# Patient Record
Sex: MALE | Race: WHITE | NOT HISPANIC OR LATINO | Employment: OTHER | URBAN - NONMETROPOLITAN AREA
[De-identification: names, ages, dates, MRNs, and addresses within clinical notes are randomized per-mention and may not be internally consistent; named-entity substitution may affect disease eponyms.]

---

## 2023-11-17 PROBLEM — E03.9 HYPOTHYROID: Status: ACTIVE | Noted: 2023-11-17

## 2023-11-17 PROBLEM — R10.30 INGUINAL PAIN: Status: RESOLVED | Noted: 2023-11-17 | Resolved: 2023-11-17

## 2023-11-17 PROBLEM — R22.0 FACIAL SWELLING: Status: RESOLVED | Noted: 2023-11-17 | Resolved: 2023-11-17

## 2023-11-17 PROBLEM — T14.8XXA LOCAL INFECTION OF WOUND: Status: RESOLVED | Noted: 2023-11-17 | Resolved: 2023-11-17

## 2023-11-17 PROBLEM — L08.9 LOCAL INFECTION OF WOUND: Status: RESOLVED | Noted: 2023-11-17 | Resolved: 2023-11-17

## 2023-11-17 PROBLEM — M25.559 HIP PAIN: Status: ACTIVE | Noted: 2023-11-17

## 2023-11-17 PROBLEM — R06.00 DYSPNEA: Status: ACTIVE | Noted: 2023-11-17

## 2023-11-17 PROBLEM — L02.01 ABSCESS OF FACE: Status: RESOLVED | Noted: 2023-11-17 | Resolved: 2023-11-17

## 2023-11-17 PROBLEM — R00.2 PALPITATIONS: Status: ACTIVE | Noted: 2023-11-17

## 2023-11-17 PROBLEM — K29.60 REFLUX GASTRITIS: Status: RESOLVED | Noted: 2023-11-17 | Resolved: 2023-11-17

## 2023-11-17 PROBLEM — R07.9 CHEST PAIN: Status: ACTIVE | Noted: 2023-11-17

## 2023-11-17 RX ORDER — LEVOTHYROXINE SODIUM 75 UG/1
75 TABLET ORAL
COMMUNITY
Start: 2023-11-11 | End: 2024-11-10

## 2023-11-17 RX ORDER — METOPROLOL TARTRATE 50 MG/1
50 TABLET ORAL 2 TIMES DAILY
COMMUNITY
Start: 2022-02-07

## 2023-11-17 RX ORDER — FLECAINIDE ACETATE 50 MG/1
50 TABLET ORAL 2 TIMES DAILY
COMMUNITY

## 2023-11-17 RX ORDER — OMEPRAZOLE AND SODIUM BICARBONATE 40; 1100 MG/1; MG/1
1 CAPSULE ORAL
COMMUNITY
Start: 2022-02-07

## 2024-12-29 ENCOUNTER — APPOINTMENT (OUTPATIENT)
Dept: CARDIOLOGY | Facility: HOSPITAL | Age: 64
End: 2024-12-29
Payer: COMMERCIAL

## 2024-12-29 ENCOUNTER — APPOINTMENT (OUTPATIENT)
Dept: RADIOLOGY | Facility: HOSPITAL | Age: 64
End: 2024-12-29
Payer: COMMERCIAL

## 2024-12-29 ENCOUNTER — HOSPITAL ENCOUNTER (EMERGENCY)
Facility: HOSPITAL | Age: 64
Discharge: HOME | End: 2024-12-29
Attending: EMERGENCY MEDICINE
Payer: COMMERCIAL

## 2024-12-29 ENCOUNTER — OFFICE VISIT (OUTPATIENT)
Dept: URGENT CARE | Age: 64
End: 2024-12-29
Payer: COMMERCIAL

## 2024-12-29 VITALS
BODY MASS INDEX: 32.96 KG/M2 | HEIGHT: 67 IN | DIASTOLIC BLOOD PRESSURE: 87 MMHG | SYSTOLIC BLOOD PRESSURE: 116 MMHG | OXYGEN SATURATION: 97 % | RESPIRATION RATE: 16 BRPM | WEIGHT: 210 LBS | HEART RATE: 74 BPM | TEMPERATURE: 98.2 F

## 2024-12-29 VITALS
TEMPERATURE: 98.1 F | SYSTOLIC BLOOD PRESSURE: 115 MMHG | HEIGHT: 67 IN | RESPIRATION RATE: 16 BRPM | HEART RATE: 67 BPM | DIASTOLIC BLOOD PRESSURE: 85 MMHG | OXYGEN SATURATION: 95 % | WEIGHT: 210 LBS | BODY MASS INDEX: 32.96 KG/M2

## 2024-12-29 DIAGNOSIS — R09.1 PLEURITIS: ICD-10-CM

## 2024-12-29 DIAGNOSIS — R07.9 CHEST PAIN, UNSPECIFIED TYPE: ICD-10-CM

## 2024-12-29 DIAGNOSIS — R07.89 OTHER CHEST PAIN: ICD-10-CM

## 2024-12-29 DIAGNOSIS — B34.2 CORONAVIRUS INFECTION: Primary | ICD-10-CM

## 2024-12-29 LAB
ALBUMIN SERPL BCP-MCNC: 4.2 G/DL (ref 3.4–5)
ALP SERPL-CCNC: 119 U/L (ref 33–136)
ALT SERPL W P-5'-P-CCNC: 60 U/L (ref 10–52)
ANION GAP SERPL CALC-SCNC: 12 MMOL/L (ref 10–20)
AST SERPL W P-5'-P-CCNC: 43 U/L (ref 9–39)
BASOPHILS # BLD AUTO: 0.02 X10*3/UL (ref 0–0.1)
BASOPHILS NFR BLD AUTO: 0.3 %
BILIRUB SERPL-MCNC: 0.6 MG/DL (ref 0–1.2)
BNP SERPL-MCNC: 15 PG/ML (ref 0–99)
BUN SERPL-MCNC: 23 MG/DL (ref 6–23)
CALCIUM SERPL-MCNC: 10.1 MG/DL (ref 8.6–10.3)
CARDIAC TROPONIN I PNL SERPL HS: <3 NG/L (ref 0–20)
CARDIAC TROPONIN I PNL SERPL HS: <3 NG/L (ref 0–20)
CHLORIDE SERPL-SCNC: 100 MMOL/L (ref 98–107)
CO2 SERPL-SCNC: 30 MMOL/L (ref 21–32)
CREAT SERPL-MCNC: 1.18 MG/DL (ref 0.5–1.3)
EGFRCR SERPLBLD CKD-EPI 2021: 69 ML/MIN/1.73M*2
EOSINOPHIL # BLD AUTO: 0.14 X10*3/UL (ref 0–0.7)
EOSINOPHIL NFR BLD AUTO: 2.3 %
ERYTHROCYTE [DISTWIDTH] IN BLOOD BY AUTOMATED COUNT: 13.4 % (ref 11.5–14.5)
GLUCOSE SERPL-MCNC: 100 MG/DL (ref 74–99)
HCT VFR BLD AUTO: 49 % (ref 41–52)
HGB BLD-MCNC: 16.2 G/DL (ref 13.5–17.5)
IMM GRANULOCYTES # BLD AUTO: 0.02 X10*3/UL (ref 0–0.7)
IMM GRANULOCYTES NFR BLD AUTO: 0.3 % (ref 0–0.9)
LYMPHOCYTES # BLD AUTO: 1.7 X10*3/UL (ref 1.2–4.8)
LYMPHOCYTES NFR BLD AUTO: 28.5 %
MAGNESIUM SERPL-MCNC: 1.98 MG/DL (ref 1.6–2.4)
MCH RBC QN AUTO: 28.5 PG (ref 26–34)
MCHC RBC AUTO-ENTMCNC: 33.1 G/DL (ref 32–36)
MCV RBC AUTO: 86 FL (ref 80–100)
MONOCYTES # BLD AUTO: 0.81 X10*3/UL (ref 0.1–1)
MONOCYTES NFR BLD AUTO: 13.6 %
NEUTROPHILS # BLD AUTO: 3.28 X10*3/UL (ref 1.2–7.7)
NEUTROPHILS NFR BLD AUTO: 55 %
NRBC BLD-RTO: 0 /100 WBCS (ref 0–0)
PLATELET # BLD AUTO: 210 X10*3/UL (ref 150–450)
POTASSIUM SERPL-SCNC: 4.3 MMOL/L (ref 3.5–5.3)
PROT SERPL-MCNC: 8 G/DL (ref 6.4–8.2)
RBC # BLD AUTO: 5.69 X10*6/UL (ref 4.5–5.9)
SODIUM SERPL-SCNC: 138 MMOL/L (ref 136–145)
WBC # BLD AUTO: 6 X10*3/UL (ref 4.4–11.3)

## 2024-12-29 PROCEDURE — 71275 CT ANGIOGRAPHY CHEST: CPT | Performed by: STUDENT IN AN ORGANIZED HEALTH CARE EDUCATION/TRAINING PROGRAM

## 2024-12-29 PROCEDURE — 83880 ASSAY OF NATRIURETIC PEPTIDE: CPT | Performed by: EMERGENCY MEDICINE

## 2024-12-29 PROCEDURE — 80053 COMPREHEN METABOLIC PANEL: CPT | Performed by: EMERGENCY MEDICINE

## 2024-12-29 PROCEDURE — 83735 ASSAY OF MAGNESIUM: CPT | Performed by: EMERGENCY MEDICINE

## 2024-12-29 PROCEDURE — 3008F BODY MASS INDEX DOCD: CPT

## 2024-12-29 PROCEDURE — 71275 CT ANGIOGRAPHY CHEST: CPT

## 2024-12-29 PROCEDURE — 93000 ELECTROCARDIOGRAM COMPLETE: CPT

## 2024-12-29 PROCEDURE — 85025 COMPLETE CBC W/AUTO DIFF WBC: CPT | Performed by: EMERGENCY MEDICINE

## 2024-12-29 PROCEDURE — 84484 ASSAY OF TROPONIN QUANT: CPT | Performed by: EMERGENCY MEDICINE

## 2024-12-29 PROCEDURE — 2550000001 HC RX 255 CONTRASTS: Performed by: EMERGENCY MEDICINE

## 2024-12-29 PROCEDURE — 2500000001 HC RX 250 WO HCPCS SELF ADMINISTERED DRUGS (ALT 637 FOR MEDICARE OP): Performed by: EMERGENCY MEDICINE

## 2024-12-29 PROCEDURE — 36415 COLL VENOUS BLD VENIPUNCTURE: CPT | Performed by: EMERGENCY MEDICINE

## 2024-12-29 PROCEDURE — 93005 ELECTROCARDIOGRAM TRACING: CPT

## 2024-12-29 PROCEDURE — 99285 EMERGENCY DEPT VISIT HI MDM: CPT | Mod: 25 | Performed by: EMERGENCY MEDICINE

## 2024-12-29 PROCEDURE — 99204 OFFICE O/P NEW MOD 45 MIN: CPT

## 2024-12-29 RX ORDER — DOXYCYCLINE HYCLATE 50 MG/1
100 CAPSULE, GELATIN COATED ORAL ONCE
Status: COMPLETED | OUTPATIENT
Start: 2024-12-29 | End: 2024-12-29

## 2024-12-29 RX ORDER — NAPROXEN SODIUM 220 MG/1
324 TABLET, FILM COATED ORAL ONCE
Status: COMPLETED | OUTPATIENT
Start: 2024-12-29 | End: 2024-12-29

## 2024-12-29 RX ORDER — DOXYCYCLINE 100 MG/1
100 TABLET ORAL 2 TIMES DAILY
Qty: 20 TABLET | Refills: 0 | Status: SHIPPED | OUTPATIENT
Start: 2024-12-29 | End: 2025-01-08

## 2024-12-29 RX ADMIN — DOXYCYCLINE HYCLATE 100 MG: 50 CAPSULE ORAL at 19:44

## 2024-12-29 RX ADMIN — IOHEXOL 75 ML: 350 INJECTION, SOLUTION INTRAVENOUS at 16:35

## 2024-12-29 RX ADMIN — ASPIRIN 81 MG CHEWABLE TABLET 324 MG: 81 TABLET CHEWABLE at 16:38

## 2024-12-29 ASSESSMENT — HEART SCORE
AGE: 45-64
HISTORY: SLIGHTLY SUSPICIOUS
RISK FACTORS: 1-2 RISK FACTORS
TROPONIN: LESS THAN OR EQUAL TO NORMAL LIMIT
ECG: NORMAL
HEART SCORE: 2

## 2024-12-29 ASSESSMENT — PAIN - FUNCTIONAL ASSESSMENT: PAIN_FUNCTIONAL_ASSESSMENT: 0-10

## 2024-12-29 ASSESSMENT — COLUMBIA-SUICIDE SEVERITY RATING SCALE - C-SSRS
1. IN THE PAST MONTH, HAVE YOU WISHED YOU WERE DEAD OR WISHED YOU COULD GO TO SLEEP AND NOT WAKE UP?: NO
6. HAVE YOU EVER DONE ANYTHING, STARTED TO DO ANYTHING, OR PREPARED TO DO ANYTHING TO END YOUR LIFE?: NO
2. HAVE YOU ACTUALLY HAD ANY THOUGHTS OF KILLING YOURSELF?: NO

## 2024-12-29 ASSESSMENT — ENCOUNTER SYMPTOMS
COUGH: 1
HEADACHES: 1

## 2024-12-29 ASSESSMENT — PAIN SCALES - GENERAL: PAINLEVEL_OUTOF10: 0 - NO PAIN

## 2024-12-29 NOTE — PROGRESS NOTES
Subjective   Patient ID: Lokesh Beasley is a 64 y.o. male. They present today with a chief complaint of COVID POSITIVE (Today), Headache, Chest Pain (Constant, worsening today), and Cough (Since Thursday).    History of Present Illness  64-year-old male presents urgent care states he is COVID-positive.  States he has been having worsening chest pain since Thursday.  States he was has a baseline chest pain however feels like it is being exacerbated and does have instances where it gets worse and then gets better.  States he has a history of A-fib and SVT.  States he also has some nausea with no vomiting, also has some diaphoresis and radiation of pain from his chest to his right shoulder.  States he always has constant dyspnea on exertion.  Denies any current focal deficits, vomiting, abdominal pain from baseline, fevers.  EKG shows sinus rhythm with heart rate 75 bpm,  ms, QRS 96 ms,  ms, QRS axis 8 degrees.  No significant ST elevation or depression.  EKG looks similar to previous taken on November 2, 2021.  Given patient's cardiac history and current chest pain is referred to emergency department.  States he feels comfortable to drive.  EKG is normal and vital signs are normal as patient is nontoxic-appearing no acute distress.  He states he is going directly to the emerged department for further evaluation.      Headache  Associated symptoms: cough    Chest Pain  Associated symptoms: cough and headache    Cough  Associated symptoms include chest pain and headaches.       Past Medical History  Allergies as of 12/29/2024 - Reviewed 12/29/2024   Allergen Reaction Noted    Venlafaxine Unknown 10/11/2022    Wheat Other 11/02/2015    Corn Other 08/18/2022    Lisinopril Cough 06/26/2019    Losartan Cough 11/17/2023       (Not in a hospital admission)       Past Medical History:   Diagnosis Date    Abscess of face 11/17/2023    Facial swelling 11/17/2023    Inguinal pain 11/17/2023    Reflux gastritis  "11/17/2023       No past surgical history on file.     reports that he has never smoked. He has never used smokeless tobacco.    Review of Systems  Review of Systems   Respiratory:  Positive for cough.    Cardiovascular:  Positive for chest pain.   Neurological:  Positive for headaches.   All other systems reviewed and are negative.                                 Objective    Vitals:    12/29/24 1443   BP: 116/87   Pulse: 74   Resp: 16   Temp: 36.8 °C (98.2 °F)   TempSrc: Oral   SpO2: 97%   Weight: 95.3 kg (210 lb)   Height: 1.702 m (5' 7\")     No LMP for male patient.    Physical Exam  Vitals reviewed.   Constitutional:       General: He is not in acute distress.     Appearance: Normal appearance. He is diaphoretic (Mild diaphoresis). He is not ill-appearing or toxic-appearing.   HENT:      Head: Normocephalic and atraumatic.      Nose: Nose normal.   Cardiovascular:      Rate and Rhythm: Normal rate and regular rhythm.   Pulmonary:      Effort: Pulmonary effort is normal. No respiratory distress.      Breath sounds: Normal breath sounds. No stridor. No wheezing, rhonchi or rales.   Abdominal:      General: Abdomen is flat.      Palpations: Abdomen is soft.      Tenderness: There is no abdominal tenderness. There is no right CVA tenderness or left CVA tenderness.   Musculoskeletal:      Cervical back: Normal range of motion and neck supple. No rigidity or tenderness.   Lymphadenopathy:      Cervical: No cervical adenopathy.   Skin:     General: Skin is warm.   Neurological:      General: No focal deficit present.      Mental Status: He is alert and oriented to person, place, and time.   Psychiatric:         Mood and Affect: Mood normal.         Behavior: Behavior normal.         Procedures    Point of Care Test & Imaging Results from this visit  No results found for this visit on 12/29/24.   No results found.    Diagnostic study results (if any) were reviewed by Patricia Pardo PA-C.    Assessment/Plan "   Allergies, medications, history, and pertinent labs/EKGs/Imaging reviewed by Patricia Pardo PA-C.     Medical Decision Making  64-year-old male presents urgent care states he is COVID-positive.  States he has been having worsening chest pain since Thursday.  States he was has a baseline chest pain however feels like it is being exacerbated and does have instances where it gets worse and then gets better.  States he has a history of A-fib and SVT.  States he also has some nausea with no vomiting, also has some diaphoresis and radiation of pain from his chest to his right shoulder.  States he always has constant dyspnea on exertion.  Denies any current focal deficits, vomiting, abdominal pain from baseline, fevers.  EKG shows sinus rhythm with heart rate 75 bpm,  ms, QRS 96 ms,  ms, QRS axis 8 degrees.  No significant ST elevation or depression.  EKG looks similar to previous taken on November 2, 2021.  Given patient's cardiac history and current chest pain is referred to emergency department.  States he feels comfortable to drive.  EKG is normal and vital signs are normal as patient is nontoxic-appearing no acute distress.  He states he is going directly to the emerged department for further evaluation.    Orders and Diagnoses  Diagnoses and all orders for this visit:  Chest pain, unspecified type  -     ECG 12 Lead      Medical Admin Record      Patient disposition: ED    Electronically signed by Patricia Pardo PA-C  2:50 PM

## 2024-12-29 NOTE — ED TRIAGE NOTES
Patient states he started having cold symptoms on Thursday and tested positive for covid today. He states he has also been having chest pain on and off. Complained of cough, fever and chills but states that has improved though feels short of breath

## 2024-12-29 NOTE — ED PROVIDER NOTES
Department of Emergency Medicine   ED  Provider Note  Admit Date/RoomTime: 12/29/2024  3:24 PM  ED [facility]: [facility]                  History of Present Illness:   Lokesh Beasley is a 64 y.o. male presenting to the ED for chest pain, beginning last Thursday.  The complaint has been persistent, moderate in severity, and worsened by nothing.  Patient had cold symptoms that started on Thursday.  He tested today and tested positive for COVID.  Patient went to urgent care today because he has been having persistent constant chest discomfort in the center of his chest.  He says it waxes and wanes.  No neck arm or jaw pain.  No nausea or vomiting.  He was having some intermittent fever and chills but says that has improved.  He does complain of some shortness of breath with exertion.  He denies any leg pain swelling or calf tenderness.  He is on Eliquis and he says he is compliant with this.  Patient has had previous cardiac ablations.  He has no history of having cardiac stents or bypass surgery.  He states he has been compliant with his apixaban.  He does report recent travel to [place] by car.  He denies leg pain swelling or calf tenderness.  No prior history of DVT or PE.      Review of Systems:   Pertinent positives and review of systems as noted above.  Remaining 10 review of systems is negative or noncontributory to today's episode of care.  Review of Systems   A complete review of systems is otherwise negative except as noted above    --------------------------------------------- PAST HISTORY ---------------------------------------------  Past Medical History:  has a past medical history of Abscess of face (11/17/2023), Facial swelling (11/17/2023), Inguinal pain (11/17/2023), and Reflux gastritis (11/17/2023).    Past Surgical History:  has no past surgical history on file.    Social History:  reports that he has never smoked. He has never used smokeless tobacco.    Family History: family history is not on file.  Unless otherwise noted, family history is non contributory    Patient's Medications   New Prescriptions    DOXYCYCLINE (ADOXA) 100 MG TABLET    Take 1 tablet (100 mg) by mouth 2 times a day for 10 days. Take with a full glass of water and do not lie down for at least 30 minutes after   Previous Medications    APIXABAN (ELIQUIS) 5 MG TABLET    Take 5 mg by mouth twice a day.    FLECAINIDE (TAMBOCOR) 50 MG TABLET    Take 1 tablet (50 mg) by mouth 2 times a day.    LEVOTHYROXINE (SYNTHROID, LEVOXYL) 75 MCG TABLET    Take 1 tablet (75 mcg) by mouth once daily in the morning. Take before meals.    METOPROLOL TARTRATE (LOPRESSOR) 50 MG TABLET    Take 1 tablet by mouth 2 times a day.    OMEPRAZOLE-SODIUM BICARBONATE (ZEGERID) 40-1.1 MG-GRAM CAPSULE    Take 1 capsule by mouth once daily in the morning. Take before meals.   Modified Medications    No medications on file   Discontinued Medications    No medications on file      The patient’s home medications have been reviewed.    Allergies: Venlafaxine, Wheat, Corn, Lisinopril, and Losartan    -------------------------------------------------- RESULTS -------------------------------------------------  All laboratory and radiology results have been personally reviewed by myself   LABS:  Labs Reviewed   COMPREHENSIVE METABOLIC PANEL - Abnormal       Result Value    Glucose 100 (*)     Sodium 138      Potassium 4.3      Chloride 100      Bicarbonate 30      Anion Gap 12      Urea Nitrogen 23      Creatinine 1.18      eGFR 69      Calcium 10.1      Albumin 4.2      Alkaline Phosphatase 119      Total Protein 8.0      AST 43 (*)     Bilirubin, Total 0.6      ALT 60 (*)    MAGNESIUM - Normal    Magnesium 1.98     B-TYPE NATRIURETIC PEPTIDE - Normal    BNP 15      Narrative:        <100 pg/mL - Heart failure unlikely  100-299 pg/mL - Intermediate probability of acute heart                  failure exacerbation. Correlate with clinical                  context and patient history.     >=300 pg/mL - Heart Failure likely. Correlate with clinical                  context and patient history.    BNP testing is performed using different testing methodology at [facility] than at other [facility]. Direct result comparisons should only be made within the same method.      SERIAL TROPONIN-INITIAL - Normal    Troponin I, High Sensitivity <3      Narrative:     Less than 99th percentile of normal range cutoff-  Female and children under 18 years old <14 ng/L; Male <21 ng/L: Negative  Repeat testing should be performed if clinically indicated.     Female and children under 18 years old 14-50 ng/L; Male 21-50 ng/L:  Consistent with possible cardiac damage and possible increased clinical   risk. Serial measurements may help to assess extent of myocardial damage.     >50 ng/L: Consistent with cardiac damage, increased clinical risk and  myocardial infarction. Serial measurements may help assess extent of   myocardial damage.      NOTE: Children less than 1 year old may have higher baseline troponin   levels and results should be interpreted in conjunction with the overall   clinical context.     NOTE: Troponin I testing is performed using a different   testing methodology at [facility] than at other   [facility]. Direct result comparisons should only   be made within the same method.   SERIAL TROPONIN, 1 HOUR - Normal    Troponin I, High Sensitivity <3      Narrative:     Less than 99th percentile of normal range cutoff-  Female and children under 18 years old <14 ng/L; Male <21 ng/L: Negative  Repeat testing should be performed if clinically indicated.     Female and children under 18 years old 14-50 ng/L; Male 21-50 ng/L:  Consistent with possible cardiac damage and possible increased clinical   risk. Serial measurements may help to assess extent of myocardial damage.     >50 ng/L: Consistent with cardiac damage, increased clinical risk and  myocardial infarction.  Serial measurements may help assess extent of   myocardial damage.      NOTE: Children less than 1 year old may have higher baseline troponin   levels and results should be interpreted in conjunction with the overall   clinical context.     NOTE: Troponin I testing is performed using a different   testing methodology at [facility] than at other   [facility]. Direct result comparisons should only   be made within the same method.   TROPONIN SERIES- (INITIAL, 1 HR)    Narrative:     The following orders were created for panel order Troponin I Series, High Sensitivity (0, 1 HR).  Procedure                               Abnormality         Status                     ---------                               -----------         ------                     Troponin I, High Sensiti...[[id number]]  Normal              Final result               Troponin, High Sensitivi...[[id number]]  Normal              Final result                 Please view results for these tests on the individual orders.   CBC WITH AUTO DIFFERENTIAL    WBC 6.0      nRBC 0.0      RBC 5.69      Hemoglobin 16.2      Hematocrit 49.0      MCV 86      MCH 28.5      MCHC 33.1      RDW 13.4      Platelets 210      Neutrophils % 55.0      Immature Granulocytes %, Automated 0.3      Lymphocytes % 28.5      Monocytes % 13.6      Eosinophils % 2.3      Basophils % 0.3      Neutrophils Absolute 3.28      Immature Granulocytes Absolute, Automated 0.02      Lymphocytes Absolute 1.70      Monocytes Absolute 0.81      Eosinophils Absolute 0.14      Basophils Absolute 0.02           RADIOLOGY:  Interpreted by Radiologist.  CT angio chest for pulmonary embolism   Final Result   New mild diffuse bilateral bronchial thickening, trace secretions in   the upper trachea and distal left mainstem/left upper lobe bronchus,   likely represents acute bronchitis or reactive airways disease.   Additionally, there is a new tiny clustered tree-in-bud nodules in   the  "peripheral right middle lobe that could represent acute   bronchiolitis versus chronic distal airway plugging from a more   remote infection.        No acute pulmonary embolism.        MACRO:   None.        Signed by: Jimbo Fong 12/29/2024 5:16 PM   Dictation workstation:   [id number]          No results found for this or any previous visit (from the past 4464 hours).  ------------------------- NURSING NOTES AND VITALS REVIEWED ---------------------------   The nursing notes within the ED encounter and vital signs as below have been reviewed.   /76   Pulse 65   Temp 36.7 °C (98.1 °F) (Temporal)   Resp 16   Ht 1.702 m (5' 7\")   Wt 95.3 kg (210 lb)   SpO2 96%   BMI 32.89 kg/m²   Oxygen Saturation Interpretation: Normal      ---------------------------------------------------PHYSICAL EXAM--------------------------------------  Physical Exam  Vitals and nursing note reviewed.   Constitutional:       General: He is not in acute distress.     Appearance: He is well-developed. He is not ill-appearing or toxic-appearing.   HENT:      Head: Normocephalic and atraumatic.      Nose: Nose normal.      Mouth/Throat:      Mouth: Mucous membranes are moist.      Pharynx: Oropharynx is clear. No oropharyngeal exudate or posterior oropharyngeal erythema.   Eyes:      General: No scleral icterus.     Extraocular Movements: Extraocular movements intact.      Conjunctiva/sclera: Conjunctivae normal.      Pupils: Pupils are equal, round, and reactive to light.   Cardiovascular:      Rate and Rhythm: Normal rate and regular rhythm.      Pulses: Normal pulses.      Heart sounds: Normal heart sounds. No murmur heard.  Pulmonary:      Effort: Pulmonary effort is normal. No respiratory distress.      Breath sounds: Normal breath sounds. No stridor. No wheezing, rhonchi or rales.   Chest:      Chest wall: No tenderness.   Abdominal:      General: Bowel sounds are normal. There is no distension.      Palpations: Abdomen is " soft.      Tenderness: There is no abdominal tenderness. There is no right CVA tenderness, left CVA tenderness, guarding or rebound.   Musculoskeletal:         General: No swelling, tenderness, deformity or signs of injury.      Cervical back: Normal range of motion and neck supple. No rigidity or tenderness.      Right lower leg: No edema.      Left lower leg: No edema.   Lymphadenopathy:      Cervical: No cervical adenopathy.   Skin:     General: Skin is warm and dry.      Capillary Refill: Capillary refill takes less than 2 seconds.      Findings: No rash.   Neurological:      Mental Status: He is alert and oriented to person, place, and time.   Psychiatric:         Mood and Affect: Mood normal.            Procedures  None  ------------------------------ ED COURSE/MEDICAL DECISION MAKING----------------------    Medical Decision Making:   Patient was seen and examined by me.  An EKG is obtained and IV is started.  Appropriate labs been ordered.      ED Course as of 12/29/24 1920   Sun Dec 29, 2024   1537 An EKG at 1529 interpreted by me at 1535.  Normal sinus rhythm 69 bpm.  Axis normal.  DC, QRS, QT intervals are normal.  No acute ST-T change.  No STEMI. [EC]   1703 A repeat EKG at 1657 interpreted by me at 1702.  Normal sinus rhythm 65 bpm.  Normal axis.  DC, QRS, QT intervals are normal.  No acute ST-T change.  No STEMI. [EC]   1704 CBC is normal  Comprehensive metabolic panel is unremarkable.  Magnesium is normal 1.98  BNP is normal at 15  Troponin I is normal at less than 3 [EC]   1906 First troponin less than 3  Second troponin less than 3 [EC]   1907 CT chest PE  IMPRESSION:  New mild diffuse bilateral bronchial thickening, trace secretions in  the upper trachea and distal left mainstem/left upper lobe bronchus,  likely represents acute bronchitis or reactive airways disease.  Additionally, there is a new tiny clustered tree-in-bud nodules in  the peripheral right middle lobe that could represent  acute  bronchiolitis versus chronic distal airway plugging from a more  remote infection.      No acute pulmonary embolism.      I believe CT changes are consistent with his coronavirus infection.  I will start him on some doxycycline to cover for possible bacterial secondary infection..    His chest pain does not appear to be cardiac in origin.  His pain is reproducible.  His pain is relatively constant and has been present for several days.  EKG shows no evidence of ischemia.  The pain is worse with cough and deep inspiration consistent with pleuritis. [EC]   1915 Heart score 2    Patient we discharged home.  I have discussed all the findings with the patient.  We placed on doxycycline 100 mg twice a day because there is some scattered findings on his CT of the chest.  I believe his chest pain is not cardiac in origin.  Patient may take Tylenol and or ibuprofen as needed for pain.  Patient is invited return of acutely worsening worrisome symptoms.  Patient encouraged to follow-up with primary care next week. [EC]      ED Course User Index  [EC] Rodolfo Cha,          Diagnoses as of 12/29/24 1920   Coronavirus infection   Pleuritis   Other chest pain      Counseling:   The emergency provider has spoken with the patient and discussed today’s results, in addition to providing specific details for the plan of care and counseling regarding the diagnosis and prognosis.  Questions are answered at this time and they are agreeable with the plan.      --------------------------------- IMPRESSION AND DISPOSITION ---------------------------------        IMPRESSION  1. Coronavirus infection    2. Pleuritis    3. Other chest pain        DISPOSITION  Disposition: Discharge to home  Patient condition is fair      Billing Provider Critical Care Time: 0 minutes     Rodolfo Cha,   12/29/24 1919       Rodolfo Cha,   12/29/24 1920

## 2024-12-30 NOTE — DISCHARGE INSTRUCTIONS
Take Tylenol and/or ibuprofen as needed for pain.  Please take doxycycline 100 mg twice a day as directed.  Continue all your regular medications and care.  Follow-up with primary care this week if possible.  Return if acutely worsening worrisome symptoms.

## 2024-12-31 LAB
ATRIAL RATE: 69 BPM
P AXIS: 35 DEGREES
P OFFSET: 194 MS
P ONSET: 143 MS
PR INTERVAL: 134 MS
Q ONSET: 210 MS
QRS COUNT: 12 BEATS
QRS DURATION: 90 MS
QT INTERVAL: 394 MS
QTC CALCULATION(BAZETT): 422 MS
QTC FREDERICIA: 413 MS
R AXIS: 5 DEGREES
T AXIS: 23 DEGREES
T OFFSET: 407 MS
VENTRICULAR RATE: 69 BPM

## 2025-01-02 LAB
ATRIAL RATE: 65 BPM
P AXIS: 59 DEGREES
P OFFSET: 194 MS
P ONSET: 154 MS
PR INTERVAL: 140 MS
Q ONSET: 224 MS
QRS COUNT: 11 BEATS
QRS DURATION: 92 MS
QT INTERVAL: 390 MS
QTC CALCULATION(BAZETT): 405 MS
QTC FREDERICIA: 400 MS
R AXIS: -1 DEGREES
T AXIS: 23 DEGREES
T OFFSET: 419 MS
VENTRICULAR RATE: 65 BPM

## 2025-01-07 LAB
ATRIAL RATE: 65 BPM
ATRIAL RATE: 69 BPM
P AXIS: 35 DEGREES
P AXIS: 59 DEGREES
P OFFSET: 194 MS
P OFFSET: 194 MS
P ONSET: 143 MS
P ONSET: 154 MS
PR INTERVAL: 134 MS
PR INTERVAL: 140 MS
Q ONSET: 210 MS
Q ONSET: 224 MS
QRS COUNT: 11 BEATS
QRS COUNT: 12 BEATS
QRS DURATION: 90 MS
QRS DURATION: 92 MS
QT INTERVAL: 390 MS
QT INTERVAL: 394 MS
QTC CALCULATION(BAZETT): 405 MS
QTC CALCULATION(BAZETT): 422 MS
QTC FREDERICIA: 400 MS
QTC FREDERICIA: 413 MS
R AXIS: -1 DEGREES
R AXIS: 5 DEGREES
T AXIS: 23 DEGREES
T AXIS: 23 DEGREES
T OFFSET: 407 MS
T OFFSET: 419 MS
VENTRICULAR RATE: 65 BPM
VENTRICULAR RATE: 69 BPM